# Patient Record
Sex: MALE | Race: WHITE | NOT HISPANIC OR LATINO | Employment: UNEMPLOYED | URBAN - METROPOLITAN AREA
[De-identification: names, ages, dates, MRNs, and addresses within clinical notes are randomized per-mention and may not be internally consistent; named-entity substitution may affect disease eponyms.]

---

## 2017-05-15 RX ORDER — MELOXICAM 15 MG/1
TABLET ORAL
Qty: 90 TABLET | Refills: 1 | OUTPATIENT
Start: 2017-05-15

## 2018-12-05 ENCOUNTER — OFFICE VISIT (OUTPATIENT)
Dept: PAIN MEDICINE | Facility: CLINIC | Age: 73
End: 2018-12-05

## 2018-12-05 VITALS
HEIGHT: 69 IN | RESPIRATION RATE: 15 BRPM | HEART RATE: 76 BPM | DIASTOLIC BLOOD PRESSURE: 80 MMHG | WEIGHT: 201.6 LBS | TEMPERATURE: 98 F | BODY MASS INDEX: 29.86 KG/M2 | OXYGEN SATURATION: 94 % | SYSTOLIC BLOOD PRESSURE: 146 MMHG

## 2018-12-05 DIAGNOSIS — M17.0 PRIMARY OSTEOARTHRITIS OF BOTH KNEES: ICD-10-CM

## 2018-12-05 DIAGNOSIS — M15.9 PRIMARY OSTEOARTHRITIS INVOLVING MULTIPLE JOINTS: ICD-10-CM

## 2018-12-05 DIAGNOSIS — G89.29 OTHER CHRONIC PAIN: Primary | ICD-10-CM

## 2018-12-05 LAB
POC AMPHETAMINES: NEGATIVE
POC BARBITURATES: NEGATIVE
POC BENZODIAZEPHINES: NEGATIVE
POC COCAINE: NEGATIVE
POC METHADONE: NEGATIVE
POC METHAMPHETAMINE SCREEN URINE: NEGATIVE
POC OPIATES: NEGATIVE
POC OXYCODONE: NEGATIVE
POC PHENCYCLIDINE: NEGATIVE
POC PROPOXYPHENE: NEGATIVE
POC THC: NEGATIVE
POC TRICYCLIC ANTIDEPRESSANTS: NEGATIVE

## 2018-12-05 PROCEDURE — 99214 OFFICE O/P EST MOD 30 MIN: CPT | Performed by: NURSE PRACTITIONER

## 2018-12-05 PROCEDURE — 80305 DRUG TEST PRSMV DIR OPT OBS: CPT | Performed by: NURSE PRACTITIONER

## 2018-12-05 RX ORDER — TRAMADOL HYDROCHLORIDE 50 MG/1
50 TABLET ORAL EVERY 6 HOURS PRN
Qty: 120 TABLET | Refills: 0 | Status: SHIPPED | OUTPATIENT
Start: 2018-12-05 | End: 2019-01-03 | Stop reason: SDUPTHER

## 2018-12-05 RX ORDER — ALPRAZOLAM 0.25 MG/1
TABLET ORAL
COMMUNITY
Start: 2018-11-23

## 2018-12-05 NOTE — PROGRESS NOTES
"CHIEF COMPLAINT  F/U osteoarthritic pain affecting knees and back. Pt states he would like to discuss medication. He has been taking tylenol \"like candy\" and it is not helping.    Subjective   Omero García is a 73 y.o. male  who presents to the office for follow-up.He has a history of chronic bilateral knee pain and joint pain.    Patient was last seen in the office 10/10/2016.  At that point time he is reporting bilateral knee pain.  Goal to hold off on any further surgery.  Minimal benefit with steroid injections and Synvisc injections.  That point in time he was maintained on tramadol 50 mg 3-4 per day, Voltaren gel, meloxicam and was reporting moderate pain reduction with this regimen.    He reports that he decided two years ago to stop his medication.  He reports significant reduction in phyical activity/mobility since that time. C/o diffuse joint pain today and can barely walk. Here today to re-establish medication regimen with goal of \"wanting to get active again\".  Knee and back are worst.      He does continue with use of Meloxicam and Tylenol.     Joint Pain   This is a chronic problem. The current episode started more than 1 year ago. The problem occurs daily. The problem has been gradually worsening. Associated symptoms include arthralgias. Pertinent negatives include no chest pain, chills, coughing, fever, headaches, numbness, vomiting or weakness. The symptoms are aggravated by walking and standing. He has tried NSAIDs for the symptoms. The treatment provided mild relief.   Back Pain   This is a chronic problem. The problem occurs daily. The problem has been gradually worsening since onset. The pain is present in the lumbar spine. The quality of the pain is described as aching. The pain is at a severity of 4/10. The symptoms are aggravated by standing (walking). Pertinent negatives include no bladder incontinence, bowel incontinence, chest pain, fever, headaches, numbness or weakness. He has " "tried NSAIDs and analgesics for the symptoms. The treatment provided moderate relief.      PEG Assessment   What number best describes your pain on average in the past week?7-8  What number best describes how, during the past week, pain has interfered with your enjoyment of life?10  What number best describes how, during the past week, pain has interfered with your general activity?  8    The following portions of the patient's history were reviewed and updated as appropriate: allergies, current medications, past family history, past medical history, past social history, past surgical history and problem list.    Review of Systems   Constitutional: Positive for activity change. Negative for chills and fever.   Respiratory: Negative for apnea, cough, shortness of breath and wheezing.    Cardiovascular: Negative for chest pain.   Gastrointestinal: Positive for constipation (occ). Negative for bowel incontinence, diarrhea and vomiting.   Genitourinary: Negative for bladder incontinence and difficulty urinating.   Musculoskeletal: Positive for arthralgias and back pain.   Neurological: Negative for dizziness, weakness, light-headedness, numbness and headaches.   Psychiatric/Behavioral: Negative for agitation, confusion, hallucinations, sleep disturbance and suicidal ideas. The patient is nervous/anxious.      Vitals:    12/05/18 1122 12/05/18 1131   BP: 166/97 146/80   Pulse: 76    Resp: 15    Temp: 98 °F (36.7 °C)    SpO2: 94%    Weight: 91.4 kg (201 lb 9.6 oz)    Height: 175.3 cm (69\")    PainSc:   4    PainLoc: Knee  Comment: joint pain      Objective   Physical Exam   Constitutional: He is oriented to person, place, and time. He appears well-developed and well-nourished.   HENT:   Head: Atraumatic.   Right Ear: External ear normal.   Eyes: Conjunctivae and EOM are normal.   Neck: Neck supple.   Cardiovascular: Normal rate.   Pulmonary/Chest: Effort normal. No respiratory distress.   Musculoskeletal:        Right " knee: Tenderness found.        Left knee: Tenderness found.        Cervical back: He exhibits tenderness.        Lumbar back: He exhibits tenderness and bony tenderness.   Diffuse OA changes - no acute synovitis noted    Neurological: He is alert and oriented to person, place, and time. He has normal strength. No sensory deficit. Gait (slowed) abnormal.   Skin: Skin is warm and dry.   Psychiatric: He has a normal mood and affect. His behavior is normal.   Nursing note and vitals reviewed.      Assessment/Plan   Omero was seen today for pain.    Diagnoses and all orders for this visit:    Other chronic pain    Primary osteoarthritis of both knees    Primary osteoarthritis involving multiple joints    Other orders  -     traMADol (ULTRAM) 50 MG tablet; Take 1 tablet by mouth Every 6 (Six) Hours As Needed for Severe Pain .      --- Obtain Lumbar MRI from [facility] -- consider interventional options for back   --- The patient has signed a copy of our prescribing agreement.  The has stated both verbal and written understanding that prescription pain medication may be stopped if - pain does not significantly decrease and/or function increase, there is evidence of diverting medication, if He obtained controlled substances from another licensed practitioner without my knowledge and approval, if I feel that it is in the patient's best interest, if He exhibits any aggressive behavior to any provider or staff member in this office.  The patient agrees to only use the medication exactly as prescribed, to fill controlled substances at the same pharmacy, to keep medication in the original container, and to store controlled substances in a locked cabinet or other secure storage unit. The patient agrees to notify the office immediately if medication is lost or stolen and will be asked to produce an official police report prior to replacing/continuing lost/stolen controlled substances.  The patient understands that there will be  routine monitoring including urine drug screens, pill counts, and review of pharmacy report.  The patient understands that He may be asked to submit to random drug screen or pill count. The patient will not seek early refills.  The patient will not use illegal street drugs while receiving controlled substances from this practice.  The patient understands that failure to adhere with this agreement may result in cessation of therapy with controlled substance prescribing.     --- Re-start Tramadol.    --- Routine UDS in office today as part of monitoring requirements for controlled substances.  The specimen was viewed and the immunoassay result reviewed and is negative across the board.  This specimen will be sent to [facility] laboratory for confirmation.     --- Follow-up 1 month          TUSHAR REPORT    As part of the patient's treatment plan, I am prescribing controlled substances. The patient has been made aware of appropriate use of such medications, including potential risk of somnolence, limited ability to drive and/or work safely, and the potential for dependence or overdose. It has also bee made clear that these medications are for use by this patient only, without concomitant use of alcohol or other substances unless prescribed.     Patient has completed prescribing agreement detailing terms of continued prescribing of controlled substances, including monitoring TUSHAR reports, urine drug screening, and pill counts if necessary. The patient is aware that inappropriate use will results in cessation of prescribing such medications.    TUSHAR report has been reviewed and scanned into the patient's chart.    As the clinician, I personally reviewed the TUSHAR from 12/4/2018 while the patient was in the office today.    History and physical exam exhibit continued safe and appropriate use of controlled substances.    EMR Dragon/Transcription disclaimer:   Much of this encounter note is an electronic  transcription/translation of spoken language to printed text. The electronic translation of spoken language may permit erroneous, or at times, nonsensical words or phrases to be inadvertently transcribed; Although I have reviewed the note for such errors, some may still exist.

## 2018-12-10 ENCOUNTER — RESULTS ENCOUNTER (OUTPATIENT)
Dept: PAIN MEDICINE | Facility: CLINIC | Age: 73
End: 2018-12-10

## 2018-12-10 DIAGNOSIS — M15.9 PRIMARY OSTEOARTHRITIS INVOLVING MULTIPLE JOINTS: ICD-10-CM

## 2018-12-10 DIAGNOSIS — M17.0 PRIMARY OSTEOARTHRITIS OF BOTH KNEES: ICD-10-CM

## 2018-12-10 DIAGNOSIS — G89.29 OTHER CHRONIC PAIN: ICD-10-CM

## 2019-01-03 ENCOUNTER — OFFICE VISIT (OUTPATIENT)
Dept: PAIN MEDICINE | Facility: CLINIC | Age: 74
End: 2019-01-03

## 2019-01-03 VITALS
OXYGEN SATURATION: 93 % | HEIGHT: 69 IN | HEART RATE: 90 BPM | TEMPERATURE: 97.9 F | RESPIRATION RATE: 16 BRPM | BODY MASS INDEX: 29.62 KG/M2 | WEIGHT: 200 LBS | DIASTOLIC BLOOD PRESSURE: 92 MMHG | SYSTOLIC BLOOD PRESSURE: 174 MMHG

## 2019-01-03 DIAGNOSIS — G89.29 CHRONIC LEFT-SIDED LOW BACK PAIN WITHOUT SCIATICA: ICD-10-CM

## 2019-01-03 DIAGNOSIS — Z79.899 ENCOUNTER FOR LONG-TERM CURRENT USE OF HIGH RISK MEDICATION: ICD-10-CM

## 2019-01-03 DIAGNOSIS — G89.29 OTHER CHRONIC PAIN: Primary | ICD-10-CM

## 2019-01-03 DIAGNOSIS — M54.50 CHRONIC LEFT-SIDED LOW BACK PAIN WITHOUT SCIATICA: ICD-10-CM

## 2019-01-03 DIAGNOSIS — M17.0 PRIMARY OSTEOARTHRITIS OF BOTH KNEES: ICD-10-CM

## 2019-01-03 DIAGNOSIS — M15.9 PRIMARY OSTEOARTHRITIS INVOLVING MULTIPLE JOINTS: ICD-10-CM

## 2019-01-03 PROCEDURE — 99214 OFFICE O/P EST MOD 30 MIN: CPT | Performed by: NURSE PRACTITIONER

## 2019-01-03 RX ORDER — TRAMADOL HYDROCHLORIDE 50 MG/1
50 TABLET ORAL EVERY 6 HOURS PRN
Qty: 120 TABLET | Refills: 1 | Status: SHIPPED | OUTPATIENT
Start: 2019-01-03 | End: 2019-04-02 | Stop reason: SDUPTHER

## 2019-01-03 NOTE — PATIENT INSTRUCTIONS
----------  Education about Sacroiliac joint injections:  This Sacroiliac joint injection (blockade) we have suggested is intended for diagnostic purposes, with the intent of offering the patient Radiofrequency thermal rhizotomy (RF) of the sensory branches to the joint if the block is diagnostically effective.  The diagnostic blockade is necessary to determine the likelihood that RF therapy could be efficacious in providing long term relief to the patient.    In this procedure, the sacroiliac joint is aligned with imaging, and under image guidance a needle is placed with the needle tip into the joint.  The needle position is confirmed to be appropriately in the joint before injection of medication into the joint.  When xray fluoroscopy is used, contrast dye is used to confirm a proper arthrogram (i.e., outline of the joint).  When ultrasound is used, IV fluid (normal saline) is injected to see the flow of the fluid into the joint.  Once confirmed, then the medication can be injected into the joint.  Oftentimes this medication is a combination of local anesthetics (for diagnostic purposes) and also a steroid (to decrease irritation & inflammation in the joint, also known as sacroilitis).      Medically, a successful RF procedure should provide a patient with 50% pain relief or more for at least 6 months.  Clinical experience suggests that successful patients receive relief more in the range of 12 months on average.  We also discussed that many patients receive therapeutic success from the intraarticular joint injection, and may not require RF ablation.  If a patient receives more than 8 weeks of relief from joint injection, then occasional repeat joint blocks for therapeutic purposes is a very reasonable alternative therapy.  This course of therapy is consistent with our LCDs according to our CMS  in the area, and therefore other insurance providers should follow accordingly.  We will monitor our  patients to screen for these therapeutic responders and will offer RF therapy only when necessary.      We discussed that joint injections & also RF procedures are not without risks.  Best practices regarding anticoagulant use & neuraxial procedures will be respected.  Oftentimes a patient on an anticoagulant can be offered a joint injection safely, but again this is not risk-free, and such patients give consent with regards to this increased bleeding risk, which could cause problems including but not limited to worsening of pain, nerve damage, or muscle damage.  Patients that are ill or otherwise may be at risk for sepsis will not have their spines accessed by neuraxial injections of any type.  This patient will not be offered these therapies if there is an increased risk.   We discussed that there is a risk of postprocedural pain and also a risk of worsening of clinical picture with these procedures as with any neuraxial procedure.    ----------    Epidural Steroid Injection  An epidural steroid injection is a shot of steroid medicine and numbing medicine that is given into the space between the spinal cord and the bones in your back (epidural space). The shot helps relieve pain caused by an irritated or swollen nerve root.  The amount of pain relief you get from the injection depends on what is causing the nerve to be swollen and irritated, and how long your pain lasts. You are more likely to benefit from this injection if your pain is strong and comes on suddenly rather than if you have had pain for a long time.  Tell a health care provider about:  · Any allergies you have.  · All medicines you are taking, including vitamins, herbs, eye drops, creams, and over-the-counter medicines.  · Any problems you or family members have had with anesthetic medicines.  · Any blood disorders you have.  · Any surgeries you have had.  · Any medical conditions you have.  · Whether you are pregnant or may be pregnant.  What are  the risks?  Generally, this is a safe procedure. However, problems may occur, including:  · Headache.  · Bleeding.  · Infection.  · Allergic reaction to medicines.  · Damage to your nerves.    What happens before the procedure?  Staying hydrated  Follow instructions from your health care provider about hydration, which may include:  · Up to 2 hours before the procedure - you may continue to drink clear liquids, such as water, clear fruit juice, black coffee, and plain tea.    Eating and drinking restrictions  Follow instructions from your health care provider about eating and drinking, which may include:  · 8 hours before the procedure - stop eating heavy meals or foods such as meat, fried foods, or fatty foods.  · 6 hours before the procedure - stop eating light meals or foods, such as toast or cereal.  · 6 hours before the procedure - stop drinking milk or drinks that contain milk.  · 2 hours before the procedure - stop drinking clear liquids.    Medicine  · You may be given medicines to lower anxiety.  · Ask your health care provider about:  ? Changing or stopping your regular medicines. This is especially important if you are taking diabetes medicines or blood thinners.  ? Taking medicines such as aspirin and ibuprofen. These medicines can thin your blood. Do not take these medicines before your procedure if your health care provider instructs you not to.  General instructions  · Plan to have someone take you home from the hospital or clinic.  What happens during the procedure?  · You may receive a medicine to help you relax (sedative).  · You will be asked to lie on your abdomen.  · The injection site will be cleaned.  · A numbing medicine (local anesthetic) will be used to numb the injection site.  · A needle will be inserted through your skin into the epidural space. You may feel some discomfort when this happens. An X-ray machine will be used to make sure the needle is put as close as possible to the  affected nerve.  · A steroid medicine and a local anesthetic will be injected into the epidural space.  · The needle will be removed.  · A bandage (dressing) will be put over the injection site.  What happens after the procedure?  · Your blood pressure, heart rate, breathing rate, and blood oxygen level will be monitored until the medicines you were given have worn off.  · Your arm or leg may feel weak or numb for a few hours.  · The injection site may feel sore.  · Do not drive for 24 hours if you received a sedative.  This information is not intended to replace advice given to you by your health care provider. Make sure you discuss any questions you have with your health care provider.  Document Released: 03/26/2009 Document Revised: 05/31/2017 Document Reviewed: 04/04/2017  [employer] Interactive Patient Education © 2018 [employer]    -----  Education about Genicular Nerve Blocks & RF at the Knee:    We engaged in an education session about the theory of genicular nerve blockade at the knee and also how radiofrequency therapy may help to provide sustained relief from chronic knee pain.      In patients with long term knee pain and/or chronic degenerative joint disease, who either fail or are not good candidates for long term medication management of knee pain, and who may have failed with intraarticular corticosteroid and/or hyaluronic acid injections in the knee, and who either may not be good orthopedic surgical candidates or may be wanting to avoid knee replacement, or have had knee replacements but continue to have severe pain, the consideration for Genicular nerve RF therapy may be a reasonable option to try to provide longer term pain relief.       The potential efficacy of the radiofrequency therapy is considered with genicular nerve blockades as a diagnostic therapy.  There is prognostic indication that patients who have diagnostic positive blockades of the nerves may respond well to radiofrequency  therapy of the same nerves.      The genicular nerves are sensory nerve branches of major nerves to the lower extremity, especially the tibial and the common peroneal nerves.  The medial branches are primarily supplied by the tibial nerve, and the lateral branches primarily by the common peroneal nerve.  These four genicular nerve branches in essence form a square of innervation around the knee.  The inferolateral branch is not easily accessible because of the fibula.  Therefore, the three branches targeted are the superolateral, superomedial, and inferomedial.      Under Xray guidance, needles are placed to these three branches' approximate locations and the branches are numbed with local anesthetic.  The question is whether blocking these branches is able to block the patient's perception of pain to the area, thus making the procedure a diagnostic blockade.   Usually the blockade is repeated to confirm reproducible diagnostic results.  Most often the blockades are performed under monitored anesthesia care, to aid in the patient being able awaken quickly from sedation and be able to engage in activities, therefore making this process an active diagnostic procedure.      If diagnostically positive (oftentimes x2), then Radiofrequency lesioning of the branches may be considered.  One or two needles are placed to the location of each nerve branch, and the needles are heated to thermally treat the nerve branches by injuring the branches, thereby inhibiting their ability to transmit painful sensory signals.  This therapy is usually performed under monitored anesthesia care as well.  The procedure could be repeated every 6-12 months as needed.  Risks of the procedure include but are not limited to bleeding, infection, injury, worsening of clinical picture, and nerve injury.    ------

## 2019-01-03 NOTE — PROGRESS NOTES
"CHIEF COMPLAINT  Pt continues with significant diffuse joint pain,with bilateral knee being the primary pain and back pain \"nearly as bad as knees\".  Tramadol,mobic and tylenol does help reduce pain somewhat.    Subjective   Omero García is a 73 y.o. male  who presents to the office for follow-up.He has a history of chronic joint pain.    Patient last seen in the office one month ago, prior to that it had been two years since the last office visit during which time he had discontinued his pain medication. He noticed a subsequent decline in his physical activity and functionality over those years and returned to our office requesting to resume pain medication for his diffuse joint pain.  Tramadol 50 mg 4/day started, he also continues with use of Meloxicam 15 mg daily and Tylenol PRN.  He reports that this regimen is helping his pain.  Taking a stool softener for constipation which is helping.      C/o back and joint pain (back pain the worse).  Left sided lumbosacral area pain, does not radiate.      Has had multiple left knee injections and Synvisc injections which helped at first.  He needs a knee replacement but has been putting this off.      Has wine/liquor/beer - 2 drinks a day     Joint Pain   This is a chronic problem. The current episode started more than 1 year ago. The problem occurs daily. The problem has been gradually worsening. Associated symptoms include arthralgias. Pertinent negatives include no chest pain, chills, coughing, fever, headaches, numbness, vomiting or weakness. The symptoms are aggravated by walking and standing. He has tried NSAIDs for the symptoms. The treatment provided mild relief.   Back Pain   This is a chronic problem. The problem occurs daily. The problem has been gradually worsening since onset. The pain is present in the lumbar spine. The quality of the pain is described as aching. The pain is at a severity of 4/10. The symptoms are aggravated by standing (walking). " "Pertinent negatives include no bladder incontinence, bowel incontinence, chest pain, fever, headaches, numbness or weakness. He has tried NSAIDs and analgesics for the symptoms. The treatment provided moderate relief.            PEG Assessment   What number best describes your pain on average in the past week?7  What number best describes how, during the past week, pain has interfered with your enjoyment of life?9  What number best describes how, during the past week, pain has interfered with your general activity?  9    The following portions of the patient's history were reviewed and updated as appropriate: allergies, current medications, past family history, past medical history, past social history, past surgical history and problem list.    Review of Systems   Constitutional: Positive for activity change. Negative for chills and fever.   Respiratory: Negative for apnea, cough, shortness of breath and wheezing.    Cardiovascular: Negative for chest pain.   Gastrointestinal: Positive for constipation (occ). Negative for bowel incontinence, diarrhea and vomiting.   Genitourinary: Negative for bladder incontinence and difficulty urinating.   Musculoskeletal: Positive for arthralgias and back pain.   Neurological: Negative for dizziness, weakness, light-headedness, numbness and headaches.   Psychiatric/Behavioral: Negative for agitation, confusion, hallucinations and suicidal ideas. The patient is nervous/anxious.        Vitals:    01/03/19 1128   BP: 174/92   Pulse: 90   Resp: 16   Temp: 97.9 °F (36.6 °C)   SpO2: 93%   Weight: 90.7 kg (200 lb)   Height: 175.3 cm (69.02\")   PainSc:   4   PainLoc: Generalized  Comment: joint pain ranges from 4-7/10     Objective   Physical Exam   Constitutional: He is oriented to person, place, and time. He appears well-developed and well-nourished. No distress.   HENT:   Head: Atraumatic.   Right Ear: External ear normal.   Eyes: Conjunctivae and EOM are normal.   Neck: Neck supple. "   Cardiovascular: Normal rate.   Pulmonary/Chest: Effort normal. No respiratory distress.   Musculoskeletal:        Right knee: Tenderness found.        Left knee: Tenderness found.        Cervical back: He exhibits tenderness.        Lumbar back: He exhibits tenderness and bony tenderness.   Diffuse OA changes - no acute synovitis noted   +left SI joint tenderness   Neurological: He is alert and oriented to person, place, and time. He has normal strength. No sensory deficit. Gait (slowed, ambulates with cane) abnormal.   Skin: Skin is warm and dry. He is not diaphoretic.   Psychiatric: He has a normal mood and affect. His behavior is normal.   Nursing note and vitals reviewed.    Assessment/Plan   Omero was seen today for arthritis.    Diagnoses and all orders for this visit:    Other chronic pain    Primary osteoarthritis involving multiple joints    Primary osteoarthritis of both knees    Chronic left-sided low back pain without sciatica    Encounter for long-term current use of high risk medication    Other orders  -     traMADol (ULTRAM) 50 MG tablet; Take 1 tablet by mouth Every 6 (Six) Hours As Needed for Severe Pain .      --- Left SI joint injection as needed in the event of a flare, could also consider LESI, Genicular nerve blocks.  Patient refuses intervention options today, given information on all three pain management procedures.    --- Refill Tramadol. WILL NOT CHANGE REGIMEN AS THERE ARE MULTIPLE INTERVENTIONAL OPTIONS TO OFFER.  Patient appears stable with current regimen. No adverse effects. Regarding continuation of opioids, there is no evidence of aberrant behavior or any red flags.  The patient continues with appropriate response to opioid therapy. ADL's remain intact by self.   --- The urine drug screen confirmation from 12/5/18 has been reviewed and the result is appropriate based on patient history and TUSHAR report.  ELEVATED ALCOHOL LEVELS, COUNSELED TODAY ON NOT DRINKING WHILE TAKING PAIN  MEDICATION.    --- Follow-up 2 months      TUSHAR REPORT    As part of the patient's treatment plan, I am prescribing controlled substances. The patient has been made aware of appropriate use of such medications, including potential risk of somnolence, limited ability to drive and/or work safely, and the potential for dependence or overdose. It has also bee made clear that these medications are for use by this patient only, without concomitant use of alcohol or other substances unless prescribed.     Patient has completed prescribing agreement detailing terms of continued prescribing of controlled substances, including monitoring TUSHAR reports, urine drug screening, and pill counts if necessary. The patient is aware that inappropriate use will results in cessation of prescribing such medications.    TUSHAR report has been reviewed and scanned into the patient's chart.    As the clinician, I personally reviewed the TUSHAR from 1-2-19 while the patient was in the office today.    History and physical exam exhibit continued safe and appropriate use of controlled substances.    EMR Dragon/Transcription disclaimer:   Much of this encounter note is an electronic transcription/translation of spoken language to printed text. The electronic translation of spoken language may permit erroneous, or at times, nonsensical words or phrases to be inadvertently transcribed; Although I have reviewed the note for such errors, some may still exist.

## 2019-02-22 RX ORDER — MELOXICAM 15 MG/1
TABLET ORAL
Qty: 90 TABLET | Refills: 1 | Status: SHIPPED | OUTPATIENT
Start: 2019-02-22 | End: 2019-04-02 | Stop reason: SDUPTHER

## 2019-04-02 ENCOUNTER — OFFICE VISIT (OUTPATIENT)
Dept: PAIN MEDICINE | Facility: CLINIC | Age: 74
End: 2019-04-02

## 2019-04-02 VITALS
RESPIRATION RATE: 16 BRPM | HEART RATE: 89 BPM | TEMPERATURE: 98 F | WEIGHT: 199 LBS | BODY MASS INDEX: 29.47 KG/M2 | SYSTOLIC BLOOD PRESSURE: 137 MMHG | DIASTOLIC BLOOD PRESSURE: 83 MMHG | OXYGEN SATURATION: 98 % | HEIGHT: 69 IN

## 2019-04-02 DIAGNOSIS — G89.29 OTHER CHRONIC PAIN: Primary | ICD-10-CM

## 2019-04-02 DIAGNOSIS — Z79.899 ENCOUNTER FOR LONG-TERM CURRENT USE OF HIGH RISK MEDICATION: ICD-10-CM

## 2019-04-02 DIAGNOSIS — M17.0 PRIMARY OSTEOARTHRITIS OF BOTH KNEES: ICD-10-CM

## 2019-04-02 DIAGNOSIS — M54.50 CHRONIC LEFT-SIDED LOW BACK PAIN WITHOUT SCIATICA: ICD-10-CM

## 2019-04-02 DIAGNOSIS — G89.29 CHRONIC LEFT-SIDED LOW BACK PAIN WITHOUT SCIATICA: ICD-10-CM

## 2019-04-02 PROCEDURE — 99214 OFFICE O/P EST MOD 30 MIN: CPT | Performed by: NURSE PRACTITIONER

## 2019-04-02 RX ORDER — TRAMADOL HYDROCHLORIDE 50 MG/1
50 TABLET ORAL EVERY 6 HOURS PRN
Qty: 120 TABLET | Refills: 1 | Status: SHIPPED | OUTPATIENT
Start: 2019-04-02 | End: 2019-06-03 | Stop reason: SDUPTHER

## 2019-04-02 RX ORDER — SILDENAFIL CITRATE 20 MG/1
TABLET ORAL
Refills: 5 | COMMUNITY
Start: 2019-03-21

## 2019-04-02 RX ORDER — ZOLPIDEM TARTRATE 10 MG/1
TABLET ORAL
COMMUNITY
Start: 2019-02-14

## 2019-04-02 RX ORDER — TOLTERODINE 4 MG/1
CAPSULE, EXTENDED RELEASE ORAL
COMMUNITY
Start: 2019-03-26

## 2019-04-02 RX ORDER — MELOXICAM 15 MG/1
15 TABLET ORAL DAILY
Qty: 90 TABLET | Refills: 1 | Status: SHIPPED | OUTPATIENT
Start: 2019-04-02 | End: 2019-06-03 | Stop reason: SDUPTHER

## 2019-04-02 NOTE — PROGRESS NOTES
CHIEF COMPLAINT  Pt is here to f/u on his Bi-lat knee and Lbp. Pt sts the pain is worse since last visit.    Subjective   Omero García is a 73 y.o. male  who presents to the office for follow-up.He has a history of chronic back and knee pain.     C/o back and joint pain (knee pain is most significant).  Left sided lumbosacral area pain, does not radiate. Maintained on Tramadol 50 mg 4/day, he also continues with use of Meloxicam 15 mg daily and Tylenol PRN.  He reports that this regimen is helping his pain.  Taking a stool softener for constipation which is helping.       Has had multiple left knee injections and Synvisc injections which helped at first.  He needs a knee replacement but has been putting this off.      Has wine/liquor/beer - 2 drinks a day      Joint Pain   This is a chronic problem. The current episode started more than 1 year ago. The problem occurs daily. The problem has been gradually worsening. Associated symptoms include arthralgias. Pertinent negatives include no abdominal pain, chest pain, chills, congestion, coughing, fatigue, fever, headaches, neck pain (Occ), numbness, vomiting or weakness. The symptoms are aggravated by walking and standing. He has tried NSAIDs for the symptoms. The treatment provided mild relief.   Back Pain   This is a chronic problem. The problem occurs daily. The problem has been waxing and waning since onset. The pain is present in the lumbar spine. The quality of the pain is described as aching. The pain is at a severity of 6/10. The symptoms are aggravated by standing (walking). Pertinent negatives include no abdominal pain, bladder incontinence, bowel incontinence, chest pain, fever, headaches, numbness or weakness. He has tried NSAIDs and analgesics for the symptoms. The treatment provided moderate relief.      PEG Assessment   What number best describes your pain on average in the past week?7  What number best describes how, during the past week, pain has  "interfered with your enjoyment of life?9  What number best describes how, during the past week, pain has interfered with your general activity?  9    The following portions of the patient's history were reviewed and updated as appropriate: allergies, current medications, past family history, past medical history, past social history, past surgical history and problem list.    Review of Systems   Constitutional: Negative for chills, fatigue and fever.   HENT: Negative for congestion.    Respiratory: Negative for cough and shortness of breath.    Cardiovascular: Negative for chest pain.   Gastrointestinal: Negative for abdominal pain, bowel incontinence and vomiting.   Genitourinary: Negative for bladder incontinence and difficulty urinating.   Musculoskeletal: Positive for arthralgias and back pain. Negative for neck pain (Occ).   Neurological: Negative for weakness, light-headedness, numbness and headaches.   Psychiatric/Behavioral: Negative for suicidal ideas.       Vitals:    04/02/19 1129   BP: 137/83   Pulse: 89   Resp: 16   Temp: 98 °F (36.7 °C)   SpO2: 98%   Weight: 90.3 kg (199 lb)   Height: 175.3 cm (69.02\")   PainSc:   6   PainLoc: Knee  Comment: Pt sts Bi-lat knee and back       Objective   Physical Exam   Constitutional: He is oriented to person, place, and time. He appears well-developed and well-nourished. No distress.   HENT:   Head: Atraumatic.   Right Ear: External ear normal.   Eyes: Conjunctivae and EOM are normal.   Neck: Neck supple.   Cardiovascular: Normal rate.   Pulmonary/Chest: Effort normal. No respiratory distress.   Musculoskeletal:        Right knee: Tenderness found.        Left knee: Tenderness found.        Lumbar back: He exhibits tenderness and bony tenderness.   Diffuse OA changes - no acute synovitis noted   +left SI joint tenderness   Neurological: He is alert and oriented to person, place, and time. He has normal strength. No sensory deficit. Gait (slowed, ambulates with cane) " abnormal.   Skin: Skin is warm and dry. He is not diaphoretic.   Psychiatric: He has a normal mood and affect. His behavior is normal.   Nursing note and vitals reviewed.      Assessment/Plan   Omero was seen today for knee pain and back pain.    Diagnoses and all orders for this visit:    Other chronic pain    Primary osteoarthritis of both knees    Chronic left-sided low back pain without sciatica    Encounter for long-term current use of high risk medication    Other orders  -     meloxicam (MOBIC) 15 MG tablet; Take 1 tablet by mouth Daily.  -     traMADol (ULTRAM) 50 MG tablet; Take 1 tablet by mouth Every 6 (Six) Hours As Needed for Severe Pain .      --- Refill Tramadol. Patient appears stable with current regimen. No adverse effects. Regarding continuation of opioids, there is no evidence of aberrant behavior or any red flags.  The patient continues with appropriate response to opioid therapy. ADL's remain intact by self.   --- The urine drug screen confirmation from 12/5/18 has been reviewed and the result is appropriate based on patient history and TUSHAR report. UPDATE NEXT VISIT - HE HAS BEEN COUNSELED ON NOT MIXING ALCOHOL AND PAIN MEDICATION   --- Consider bilateral GNB - not interested today   --- Follow-up 2 months         TUSHAR REPORT    As part of the patient's treatment plan, I am prescribing controlled substances. The patient has been made aware of appropriate use of such medications, including potential risk of somnolence, limited ability to drive and/or work safely, and the potential for dependence or overdose. It has also bee made clear that these medications are for use by this patient only, without concomitant use of alcohol or other substances unless prescribed.     Patient has completed prescribing agreement detailing terms of continued prescribing of controlled substances, including monitoring TUSHAR reports, urine drug screening, and pill counts if necessary. The patient is aware that  inappropriate use will results in cessation of prescribing such medications.    TUSHAR report has been reviewed and scanned into the patient's chart.    As the clinician, I personally reviewed the TUSHAR from 4/1/19 while the patient was in the office today.    History and physical exam exhibit continued safe and appropriate use of controlled substances.    EMR Dragon/Transcription disclaimer:   Much of this encounter note is an electronic transcription/translation of spoken language to printed text. The electronic translation of spoken language may permit erroneous, or at times, nonsensical words or phrases to be inadvertently transcribed; Although I have reviewed the note for such errors, some may still exist.

## 2019-06-03 ENCOUNTER — OFFICE VISIT (OUTPATIENT)
Dept: PAIN MEDICINE | Facility: CLINIC | Age: 74
End: 2019-06-03

## 2019-06-03 VITALS
OXYGEN SATURATION: 94 % | DIASTOLIC BLOOD PRESSURE: 89 MMHG | TEMPERATURE: 97.9 F | SYSTOLIC BLOOD PRESSURE: 164 MMHG | HEART RATE: 80 BPM | RESPIRATION RATE: 20 BRPM | WEIGHT: 194.4 LBS | BODY MASS INDEX: 28.79 KG/M2 | HEIGHT: 69 IN

## 2019-06-03 DIAGNOSIS — M15.8 OTHER OSTEOARTHRITIS INVOLVING MULTIPLE JOINTS: ICD-10-CM

## 2019-06-03 DIAGNOSIS — Z79.899 ENCOUNTER FOR LONG-TERM CURRENT USE OF HIGH RISK MEDICATION: ICD-10-CM

## 2019-06-03 DIAGNOSIS — G89.29 CHRONIC LEFT-SIDED LOW BACK PAIN WITHOUT SCIATICA: ICD-10-CM

## 2019-06-03 DIAGNOSIS — M17.0 OSTEOARTHRITIS OF BOTH KNEES, UNSPECIFIED OSTEOARTHRITIS TYPE: ICD-10-CM

## 2019-06-03 DIAGNOSIS — M54.50 CHRONIC LEFT-SIDED LOW BACK PAIN WITHOUT SCIATICA: ICD-10-CM

## 2019-06-03 DIAGNOSIS — G89.4 CHRONIC PAIN SYNDROME: Primary | ICD-10-CM

## 2019-06-03 LAB
POC AMPHETAMINES: NEGATIVE
POC BARBITURATES: NEGATIVE
POC BENZODIAZEPHINES: POSITIVE
POC COCAINE: NEGATIVE
POC METHADONE: NEGATIVE
POC METHAMPHETAMINE SCREEN URINE: NEGATIVE
POC OPIATES: NEGATIVE
POC OXYCODONE: NEGATIVE
POC PHENCYCLIDINE: NEGATIVE
POC PROPOXYPHENE: NEGATIVE
POC THC: NEGATIVE
POC TRICYCLIC ANTIDEPRESSANTS: NEGATIVE

## 2019-06-03 PROCEDURE — 80305 DRUG TEST PRSMV DIR OPT OBS: CPT | Performed by: ANESTHESIOLOGY

## 2019-06-03 PROCEDURE — 99213 OFFICE O/P EST LOW 20 MIN: CPT | Performed by: ANESTHESIOLOGY

## 2019-06-03 RX ORDER — TRAMADOL HYDROCHLORIDE 50 MG/1
50 TABLET ORAL EVERY 6 HOURS PRN
Qty: 120 TABLET | Refills: 1 | Status: SHIPPED | OUTPATIENT
Start: 2019-06-03 | End: 2019-08-22 | Stop reason: SDUPTHER

## 2019-06-03 RX ORDER — MELOXICAM 15 MG/1
15 TABLET ORAL DAILY
Qty: 90 TABLET | Refills: 1 | Status: SHIPPED | OUTPATIENT
Start: 2019-06-03 | End: 2019-08-22 | Stop reason: SDUPTHER

## 2019-06-03 NOTE — PROGRESS NOTES
CHIEF COMPLAINT    F/u bilat knee and back pain. Pt states pain has worsened since last ov. Pt has been using tramadol and mobic to control pain. Pt states difficulty walking due to increased knee pain, sitting decreases pain.     Subjective   Omero García is a 73 y.o. male  who presents to the office for follow-up.He has a history of knee pain and back pain.    He states that he has been engaging in more physical activity.  Has been trying to walk daily.  The increase walking clears his pain and does make it then difficult to walk.  He notes that he feels better when he rests but he also does not want to be sedentary and he expresses concern about worsening of her overall condition with sedentary status.    Joint Pain   This is a chronic problem. The current episode started more than 1 year ago. The problem occurs daily. The problem has been gradually worsening. Associated symptoms include arthralgias (bilat knees), fatigue (occ) and weakness (bilat knees). Pertinent negatives include no abdominal pain, chest pain, chills, congestion, coughing, fever, headaches, neck pain (Occ), numbness or vomiting. The symptoms are aggravated by walking and standing. He has tried NSAIDs for the symptoms. The treatment provided mild relief.   Back Pain   This is a chronic problem. The problem occurs daily. The problem has been gradually worsening since onset. The pain is present in the lumbar spine. The quality of the pain is described as aching. The pain is at a severity of 6/10. The symptoms are aggravated by standing (walking). Associated symptoms include weakness (bilat knees). Pertinent negatives include no abdominal pain, bladder incontinence, bowel incontinence, chest pain, fever, headaches or numbness. He has tried NSAIDs and analgesics for the symptoms. The treatment provided moderate relief.        PEG Assessment   What number best describes your pain on average in the past week?8  What number best describes how,  during the past week, pain has interfered with your enjoyment of life?9  What number best describes how, during the past week, pain has interfered with your general activity?  9    --  The aforementioned information the Chief Complaint section and above subjective data including any HPI data has been personally reviewed and affirmed.  --        The following portions of the patient's history were reviewed and updated as appropriate: allergies, current medications, past family history, past medical history, past social history, past surgical history and problem list.    -------    The following portions of the patient's history were reviewed and updated as appropriate: allergies, current medications, past family history, past medical history, past social history, past surgical history and problem list.    No Known Allergies      Current Outpatient Medications:   •  ALPRAZolam (XANAX) 0.25 MG tablet, , Disp: , Rfl:   •  amLODIPine (NORVASC) 5 MG tablet, Take  by mouth daily., Disp: , Rfl:   •  atenolol (TENORMIN) 25 MG tablet, , Disp: , Rfl:   •  levothyroxine (SYNTHROID, LEVOTHROID) 100 MCG tablet, Take  by mouth daily., Disp: , Rfl:   •  meloxicam (MOBIC) 15 MG tablet, Take 1 tablet by mouth Daily., Disp: 90 tablet, Rfl: 1  •  metFORMIN XR (GLUCOPHAGE-XR) 500 MG 24 hr tablet, Take  by mouth 2 (two) times a day., Disp: , Rfl:   •  sildenafil (REVATIO) 20 MG tablet, take 1-5 tablets BY MOUTH AS NEEDED, start with lowest DOSE AND increase AS NEEDED, Disp: , Rfl: 5  •  Testosterone Cypionate (DEPOTESTOTERONE CYPIONATE) 200 MG/ML injection, Apply  to cheek., Disp: , Rfl:   •  tolterodine LA (DETROL LA) 4 MG 24 hr capsule, , Disp: , Rfl:   •  traMADol (ULTRAM) 50 MG tablet, Take 1 tablet by mouth Every 6 (Six) Hours As Needed for Severe Pain ., Disp: 120 tablet, Rfl: 1  •  zolpidem (AMBIEN) 10 MG tablet, , Disp: , Rfl:     Current Outpatient Medications on File Prior to Visit   Medication Sig Dispense Refill   •  ALPRAZolam (XANAX) 0.25 MG tablet      • amLODIPine (NORVASC) 5 MG tablet Take  by mouth daily.     • atenolol (TENORMIN) 25 MG tablet      • levothyroxine (SYNTHROID, LEVOTHROID) 100 MCG tablet Take  by mouth daily.     • metFORMIN XR (GLUCOPHAGE-XR) 500 MG 24 hr tablet Take  by mouth 2 (two) times a day.     • sildenafil (REVATIO) 20 MG tablet take 1-5 tablets BY MOUTH AS NEEDED, start with lowest DOSE AND increase AS NEEDED  5   • Testosterone Cypionate (DEPOTESTOTERONE CYPIONATE) 200 MG/ML injection Apply  to cheek.     • tolterodine LA (DETROL LA) 4 MG 24 hr capsule      • zolpidem (AMBIEN) 10 MG tablet      • [DISCONTINUED] meloxicam (MOBIC) 15 MG tablet Take 1 tablet by mouth Daily. 90 tablet 1   • [DISCONTINUED] traMADol (ULTRAM) 50 MG tablet Take 1 tablet by mouth Every 6 (Six) Hours As Needed for Severe Pain . 120 tablet 1     No current facility-administered medications on file prior to visit.        Patient Active Problem List   Diagnosis   • Chronic pain due to injury   • Degenerative joint disease involving multiple joints   • Arthritis of knee, degenerative   • Chronic left-sided low back pain without sciatica   • Encounter for long-term current use of high risk medication       Past Medical History:   Diagnosis Date   • Arthritis    • Diabetes (CMS/HCC)    • Hypertension    • Hypothyroidism    • Septicemia (CMS/HCC)        Past Surgical History:   Procedure Laterality Date   • HAND SURGERY     • REPLACEMENT TOTAL KNEE     • TONSILLECTOMY         Family History   Problem Relation Age of Onset   • Hypertension Mother    • Arthritis Mother    • Diabetes Father    • Hypertension Father    • Arthritis Father        Social History     Socioeconomic History   • Marital status:      Spouse name: Not on file   • Number of children: Not on file   • Years of education: Not on file   • Highest education level: Not on file   Tobacco Use   • Smoking status: Former Smoker   • Smokeless tobacco: Never Used  "  Substance and Sexual Activity   • Alcohol use: Yes     Comment: occ   • Drug use: No   • Sexual activity: Defer       -------        Review of Systems   Constitutional: Positive for activity change (dec) and fatigue (occ). Negative for chills and fever.   HENT: Negative for congestion.    Respiratory: Negative for cough and shortness of breath.    Cardiovascular: Negative for chest pain.   Gastrointestinal: Positive for constipation (taking stool softner). Negative for abdominal pain, bowel incontinence, diarrhea and vomiting.   Genitourinary: Negative for bladder incontinence and difficulty urinating.   Musculoskeletal: Positive for arthralgias (bilat knees) and back pain. Negative for neck pain (Occ).   Neurological: Positive for dizziness (occ) and weakness (bilat knees). Negative for light-headedness, numbness and headaches.   Psychiatric/Behavioral: Negative for sleep disturbance and suicidal ideas.               -------    Prelim UDS Immunoassay:    THC  (marijuana)  negative  AMRIT (cocaine) negative  OPI (opiate) negative  AMP (amphet) negative  MET (methamph) negative  PCP (pcp)  negative  MDMA (ecstacy) negative  BAR (barbituate) negative  BZO (benzodiaz) positive  MTD (methadone) negative  TCA (tricyclic) negative  OXY (oxycodone) negative    GreenTemp warm  Appearance WNL    -------        Vitals:    06/03/19 1123   BP: 164/89  Comment: pt took bp med this am   Pulse: 80   Resp: 20   Temp: 97.9 °F (36.6 °C)   SpO2: 94%   Weight: 88.2 kg (194 lb 6.4 oz)   Height: 175.3 cm (69.02\")   PainSc:   2   PainLoc: Knee  Comment: bilat         Objective   Physical Exam  VSS, NNR, NCAT, NMNA, NRD, AAOx3.  He has a mild limp.  He can move his knees throughout range of motion with some mild crepitus bilaterally.  He is able to flex his lumbar spine to toe-touch.  Minimal pain on extension to 10 degrees.  Lumbar loading is positive.      Assessment/Plan   Omero was seen today for back pain and knee pain.    Diagnoses " and all orders for this visit:    Chronic pain syndrome    Encounter for long-term current use of high risk medication    Other osteoarthritis involving multiple joints    Chronic left-sided low back pain without sciatica    Osteoarthritis of both knees, unspecified osteoarthritis type    Other orders  -     traMADol (ULTRAM) 50 MG tablet; Take 1 tablet by mouth Every 6 (Six) Hours As Needed for Severe Pain .  -     meloxicam (MOBIC) 15 MG tablet; Take 1 tablet by mouth Daily.      We discussed the importance of maintaining physical activity, and also reasonable expectations about medication use in helping to manage pain, considering pain a chronic disease, and that it is a disease to be managed rather than cured.  He expresses a desire to avoid escalation of medications.    Regarding his functional goals, it seems with his activity levels that he is meeting them.    --- Follow-up 2 months    --UDS today for routine confirmation           Patient appears stable with current regimen. No adverse effects. Regarding continuation of opioids, there is no evidence of aberrant behavior or any red flags.  The patient continues with appropriate response to opioid therapy. ADL's remain intact by self.         TUSHAR REPORT    As part of the patient's treatment plan, I am prescribing controlled substances. The patient has been made aware of appropriate use of such medications, including potential risk of somnolence, limited ability to drive and/or work safely, and the potential for dependence or overdose. It has also bee made clear that these medications are for use by this patient only, without concomitant use of alcohol or other substances unless prescribed.     Patient has completed prescribing agreement detailing terms of continued prescribing of controlled substances, including monitoring TUSHAR reports, urine drug screening, and pill counts if necessary. The patient is aware that inappropriate use will results in  cessation of prescribing such medications.    TUSHAR report has been reviewed and scanned into the patient's chart.    As the clinician, I personally reviewed the TUSHAR from as above while the patient was in the office today.    History and physical exam exhibit continued safe and appropriate use of controlled substances.      EMR Dragon/Transcription disclaimer:   Much of this encounter note is an electronic transcription/translation of spoken language to printed text. The electronic translation of spoken language may permit erroneous, or at times, nonsensical words or phrases to be inadvertently transcribed; Although I have reviewed the note for such errors, some may still exist.

## 2019-06-08 ENCOUNTER — RESULTS ENCOUNTER (OUTPATIENT)
Dept: PAIN MEDICINE | Facility: CLINIC | Age: 74
End: 2019-06-08

## 2019-06-08 DIAGNOSIS — M15.8 OTHER OSTEOARTHRITIS INVOLVING MULTIPLE JOINTS: ICD-10-CM

## 2019-06-08 DIAGNOSIS — M17.0 OSTEOARTHRITIS OF BOTH KNEES, UNSPECIFIED OSTEOARTHRITIS TYPE: ICD-10-CM

## 2019-06-08 DIAGNOSIS — M54.50 CHRONIC LEFT-SIDED LOW BACK PAIN WITHOUT SCIATICA: ICD-10-CM

## 2019-06-08 DIAGNOSIS — G89.4 CHRONIC PAIN SYNDROME: ICD-10-CM

## 2019-06-08 DIAGNOSIS — Z79.899 ENCOUNTER FOR LONG-TERM CURRENT USE OF HIGH RISK MEDICATION: ICD-10-CM

## 2019-06-08 DIAGNOSIS — G89.29 CHRONIC LEFT-SIDED LOW BACK PAIN WITHOUT SCIATICA: ICD-10-CM

## 2019-08-22 ENCOUNTER — OFFICE VISIT (OUTPATIENT)
Dept: PAIN MEDICINE | Facility: CLINIC | Age: 74
End: 2019-08-22

## 2019-08-22 VITALS
OXYGEN SATURATION: 93 % | HEIGHT: 69 IN | RESPIRATION RATE: 18 BRPM | BODY MASS INDEX: 29.24 KG/M2 | TEMPERATURE: 98.1 F | SYSTOLIC BLOOD PRESSURE: 158 MMHG | WEIGHT: 197.4 LBS | HEART RATE: 80 BPM | DIASTOLIC BLOOD PRESSURE: 82 MMHG

## 2019-08-22 DIAGNOSIS — G89.29 OTHER CHRONIC PAIN: Primary | ICD-10-CM

## 2019-08-22 DIAGNOSIS — M54.50 CHRONIC LEFT-SIDED LOW BACK PAIN WITHOUT SCIATICA: ICD-10-CM

## 2019-08-22 DIAGNOSIS — G89.29 CHRONIC LEFT-SIDED LOW BACK PAIN WITHOUT SCIATICA: ICD-10-CM

## 2019-08-22 DIAGNOSIS — Z79.899 ENCOUNTER FOR LONG-TERM CURRENT USE OF HIGH RISK MEDICATION: ICD-10-CM

## 2019-08-22 DIAGNOSIS — M15.8 OTHER OSTEOARTHRITIS INVOLVING MULTIPLE JOINTS: ICD-10-CM

## 2019-08-22 PROCEDURE — 99214 OFFICE O/P EST MOD 30 MIN: CPT | Performed by: NURSE PRACTITIONER

## 2019-08-22 RX ORDER — MELOXICAM 15 MG/1
15 TABLET ORAL DAILY
Qty: 90 TABLET | Refills: 1 | Status: SHIPPED | OUTPATIENT
Start: 2019-08-22 | End: 2020-03-03 | Stop reason: SDUPTHER

## 2019-08-22 RX ORDER — TRAMADOL HYDROCHLORIDE 50 MG/1
50 TABLET ORAL EVERY 6 HOURS PRN
Qty: 120 TABLET | Refills: 1 | Status: SHIPPED | OUTPATIENT
Start: 2019-08-22 | End: 2020-03-03 | Stop reason: SDUPTHER

## 2019-08-22 NOTE — PROGRESS NOTES
CHIEF COMPLAINT  Follow-up for back and knee pain. Mr. García states that he fell Monday in his yard and landed on his chest. He states that he has had increases pain since then.    Subjective   Omero García is a 73 y.o. male  who presents to the office for follow-up.He has a history of joint pain, back pain.    C/o back and joint pain (knee pain is most significant).  Left sided lumbosacral area pain, does not radiate. Maintained on Tramadol 50 mg 2-4/day, he also continues with use of Meloxicam 15 mg daily and Tylenol PRN.  He reports that this regimen is helping his pain.  Taking a stool softener for constipation which is helping.       Has had multiple left knee injections and Synvisc injections which helped at first.  He needs a knee replacement but has been putting this off.  not interested in GNB as we have discussed before.      Discussed alcohol consumption.  Does admit to 1-2 drinks of beer/wine daily, but does not take while taking pain medication, states that he understands the dangers of this and does not do this.      Joint Pain   This is a chronic problem. The current episode started more than 1 year ago. The problem occurs daily. The problem has been waxing and waning. Associated symptoms include arthralgias and joint swelling (bilateral knees). Pertinent negatives include no abdominal pain, chest pain, chills, congestion, coughing, fatigue, fever, headaches, neck pain (Occ), numbness, vomiting or weakness. The symptoms are aggravated by walking and standing. He has tried NSAIDs for the symptoms. The treatment provided mild relief.   Back Pain   This is a chronic problem. The problem occurs daily. The problem has been waxing and waning since onset. The pain is present in the lumbar spine. The quality of the pain is described as aching. The pain is at a severity of 7/10. The symptoms are aggravated by standing (walking). Pertinent negatives include no abdominal pain, bladder incontinence,  "bowel incontinence, chest pain, fever, headaches, numbness or weakness. He has tried NSAIDs and analgesics for the symptoms. The treatment provided moderate relief.      PEG Assessment   What number best describes your pain on average in the past week?8  What number best describes how, during the past week, pain has interfered with your enjoyment of life?9  What number best describes how, during the past week, pain has interfered with your general activity?  9    The following portions of the patient's history were reviewed and updated as appropriate: allergies, current medications, past family history, past medical history, past social history, past surgical history and problem list.    Review of Systems   Constitutional: Negative for chills, fatigue and fever.   HENT: Negative for congestion.    Eyes: Negative for visual disturbance.   Respiratory: Negative for cough, shortness of breath and wheezing.    Cardiovascular: Negative.  Negative for chest pain.   Gastrointestinal: Positive for constipation. Negative for abdominal pain, bowel incontinence, diarrhea and vomiting.   Genitourinary: Positive for difficulty urinating. Negative for bladder incontinence.   Musculoskeletal: Positive for arthralgias, back pain and joint swelling (bilateral knees). Negative for neck pain (Occ).   Neurological: Negative for weakness, numbness and headaches.   Psychiatric/Behavioral: Negative for sleep disturbance and suicidal ideas. The patient is nervous/anxious.      Vitals:    08/22/19 1135   BP: 158/82   Pulse: 80   Resp: 18   Temp: 98.1 °F (36.7 °C)   SpO2: 93%   Weight: 89.5 kg (197 lb 6.4 oz)   Height: 175.3 cm (69.02\")   PainSc:   7   PainLoc: Knee     Objective   Physical Exam   Constitutional: He is oriented to person, place, and time. He appears well-developed and well-nourished. No distress.   HENT:   Head: Atraumatic.   Right Ear: External ear normal.   Eyes: Conjunctivae and EOM are normal.   Neck: Neck supple. "   Cardiovascular: Normal rate.   Pulmonary/Chest: Effort normal. No respiratory distress.   Musculoskeletal:        Right knee: Tenderness found.        Left knee: Tenderness found.        Lumbar back: He exhibits tenderness and bony tenderness.   Diffuse OA changes - no acute synovitis noted   +left SI joint tenderness   Neurological: He is alert and oriented to person, place, and time. He has normal strength. No sensory deficit. Gait (slowed, ambulates with cane) abnormal.   Skin: Skin is warm and dry. He is not diaphoretic.   Psychiatric: He has a normal mood and affect. His behavior is normal.   Nursing note and vitals reviewed.    Assessment/Plan   Omero was seen today for back pain and knee pain.    Diagnoses and all orders for this visit:    Other chronic pain    Other osteoarthritis involving multiple joints    Chronic left-sided low back pain without sciatica    Encounter for long-term current use of high risk medication      --- Declines interventional options at this time. Discussed Genicular Nerve Block and bilateral intra-articular hip injection.  Patient is focused on increasing the dose of his pain medication, I have discussed with him on many occasions including today that this is not an appropriate step when there are other options available to help manage his pain.    --- Refill Tramadol. Patient appears stable with current regimen. No adverse effects. Regarding continuation of opioids, there is no evidence of aberrant behavior or any red flags.  The patient continues with appropriate response to opioid therapy. ADL's remain intact by self.   --- The urine drug screen confirmation from 6/3/19 has been reviewed and the result is appropriate based on patient history and TUSHAR report  --- Follow-up 2 months        TUSHAR REPORT  As part of the patient's treatment plan, I am prescribing controlled substances. The patient has been made aware of appropriate use of such medications, including potential  risk of somnolence, limited ability to drive and/or work safely, and the potential for dependence or overdose. It has also bee made clear that these medications are for use by this patient only, without concomitant use of alcohol or other substances unless prescribed.     Patient has completed prescribing agreement detailing terms of continued prescribing of controlled substances, including monitoring TUSHAR reports, urine drug screening, and pill counts if necessary. The patient is aware that inappropriate use will results in cessation of prescribing such medications.    TUSHAR report has been reviewed and scanned into the patient's chart.    As the clinician, I personally reviewed the TUSHAR from 8/22/19 while the patient was in the office today.    History and physical exam exhibit continued safe and appropriate use of controlled substances.    EMR Dragon/Transcription disclaimer:   Much of this encounter note is an electronic transcription/translation of spoken language to printed text. The electronic translation of spoken language may permit erroneous, or at times, nonsensical words or phrases to be inadvertently transcribed; Although I have reviewed the note for such errors, some may still exist.

## 2019-12-30 RX ORDER — TRAMADOL HYDROCHLORIDE 50 MG/1
TABLET ORAL
Qty: 120 TABLET | Refills: 0 | OUTPATIENT
Start: 2019-12-30

## 2019-12-30 NOTE — TELEPHONE ENCOUNTER
Over 4 months since last ov with no schedule refills. Cancelled two appointments.  Needs f/u if this is to be continued.

## 2020-02-25 RX ORDER — TRAMADOL HYDROCHLORIDE 50 MG/1
TABLET ORAL
Qty: 120 TABLET | Refills: 0 | OUTPATIENT
Start: 2020-02-25

## 2020-03-03 ENCOUNTER — RESULTS ENCOUNTER (OUTPATIENT)
Dept: PAIN MEDICINE | Facility: CLINIC | Age: 75
End: 2020-03-03

## 2020-03-03 ENCOUNTER — OFFICE VISIT (OUTPATIENT)
Dept: PAIN MEDICINE | Facility: CLINIC | Age: 75
End: 2020-03-03

## 2020-03-03 VITALS
DIASTOLIC BLOOD PRESSURE: 94 MMHG | WEIGHT: 194.8 LBS | HEIGHT: 69 IN | TEMPERATURE: 98.2 F | RESPIRATION RATE: 18 BRPM | OXYGEN SATURATION: 93 % | BODY MASS INDEX: 28.85 KG/M2 | HEART RATE: 87 BPM | SYSTOLIC BLOOD PRESSURE: 168 MMHG

## 2020-03-03 DIAGNOSIS — G89.29 CHRONIC LEFT-SIDED LOW BACK PAIN WITHOUT SCIATICA: ICD-10-CM

## 2020-03-03 DIAGNOSIS — G89.29 OTHER CHRONIC PAIN: Primary | ICD-10-CM

## 2020-03-03 DIAGNOSIS — M15.8 OTHER OSTEOARTHRITIS INVOLVING MULTIPLE JOINTS: ICD-10-CM

## 2020-03-03 DIAGNOSIS — Z79.899 ENCOUNTER FOR LONG-TERM CURRENT USE OF HIGH RISK MEDICATION: ICD-10-CM

## 2020-03-03 DIAGNOSIS — M54.50 CHRONIC LEFT-SIDED LOW BACK PAIN WITHOUT SCIATICA: ICD-10-CM

## 2020-03-03 DIAGNOSIS — G89.29 OTHER CHRONIC PAIN: ICD-10-CM

## 2020-03-03 PROCEDURE — 99214 OFFICE O/P EST MOD 30 MIN: CPT | Performed by: NURSE PRACTITIONER

## 2020-03-03 RX ORDER — MELOXICAM 15 MG/1
15 TABLET ORAL DAILY
Qty: 90 TABLET | Refills: 1 | Status: SHIPPED | OUTPATIENT
Start: 2020-03-03

## 2020-03-03 RX ORDER — TRAMADOL HYDROCHLORIDE 50 MG/1
50 TABLET ORAL EVERY 6 HOURS PRN
Qty: 120 TABLET | Refills: 1 | Status: SHIPPED | OUTPATIENT
Start: 2020-03-03

## 2020-03-03 NOTE — PROGRESS NOTES
CHIEF COMPLAINT  Follow-up for back and knee pain. Pain has gotten worse since last appt.    Subjective   Omero García is a 74 y.o. male  who presents to the office for follow-up.He has a history of back pain, joint pain.    C/o back and joint pain (knee pain is most significant).  Left sided lumbosacral area pain, does not radiate. Maintained on Tramadol 50 mg which he takes very sparingly, he also continues with use of Meloxicam 15 mg daily and Tylenol PRN.  He reports that this regimen does help his pain.       Has had multiple left knee injections and Synvisc injections which helped at first.  He needs a knee replacement but has been putting this off.  Not interested in GNB as we have discussed before.       Discussed alcohol consumption.  Does admit to 1-2 drinks of beer/wine daily, but does not take while taking pain medication, states that he understands the dangers of this and does not do this.  Takes his pain medication VERY sparingly, #60 have lasted over 4 months.      Joint Pain   This is a chronic problem. The current episode started more than 1 year ago. The problem occurs daily. The problem has been waxing and waning. Associated symptoms include arthralgias, congestion, fatigue, joint swelling (left ankle) and weakness. Pertinent negatives include no abdominal pain, chest pain, chills, coughing, fever, headaches, neck pain (Occ), numbness or vomiting. The symptoms are aggravated by walking and standing. He has tried NSAIDs for the symptoms. The treatment provided mild relief.   Back Pain   This is a chronic problem. The problem occurs daily. The problem has been waxing and waning since onset. The pain is present in the lumbar spine. The quality of the pain is described as aching. The pain is at a severity of 7/10. The symptoms are aggravated by standing (walking). Associated symptoms include weakness. Pertinent negatives include no abdominal pain, bladder incontinence, bowel incontinence, chest  "pain, fever, headaches or numbness. He has tried NSAIDs and analgesics for the symptoms. The treatment provided moderate relief.      PEG Assessment   What number best describes your pain on average in the past week?8  What number best describes how, during the past week, pain has interfered with your enjoyment of life?9  What number best describes how, during the past week, pain has interfered with your general activity?  8    The following portions of the patient's history were reviewed and updated as appropriate: allergies, current medications, past family history, past medical history, past social history, past surgical history and problem list.    Review of Systems   Constitutional: Positive for fatigue. Negative for chills and fever.   HENT: Positive for congestion.    Eyes: Negative for visual disturbance.   Respiratory: Negative for cough, shortness of breath and wheezing.    Cardiovascular: Negative.  Negative for chest pain.   Gastrointestinal: Positive for constipation. Negative for abdominal pain, bowel incontinence, diarrhea and vomiting.   Genitourinary: Negative for bladder incontinence and difficulty urinating.   Musculoskeletal: Positive for arthralgias, back pain and joint swelling (left ankle). Negative for neck pain (Occ).   Neurological: Positive for weakness. Negative for numbness and headaches.   Psychiatric/Behavioral: Positive for sleep disturbance. Negative for suicidal ideas. The patient is not nervous/anxious.      Vitals:    03/03/20 1110   BP: 168/94   Pulse: 87   Resp: 18   Temp: 98.2 °F (36.8 °C)   SpO2: 93%   Weight: 88.4 kg (194 lb 12.8 oz)   Height: 175.3 cm (69.02\")   PainSc:   5   PainLoc: Back     Objective   Physical Exam   Constitutional: He is oriented to person, place, and time. He appears well-developed and well-nourished. No distress.   HENT:   Head: Atraumatic.   Right Ear: External ear normal.   Eyes: Conjunctivae and EOM are normal.   Neck: Neck supple. "   Cardiovascular: Normal rate.   Pulmonary/Chest: Effort normal. No respiratory distress.   Musculoskeletal:        Right knee: Tenderness found.        Left knee: Tenderness found.        Lumbar back: He exhibits tenderness and bony tenderness.   Diffuse OA changes - no acute synovitis noted   +left SI joint tenderness   Neurological: He is alert and oriented to person, place, and time. He has normal strength. No sensory deficit. Gait (slowed, ambulates with cane) abnormal.   Skin: Skin is warm and dry. He is not diaphoretic.   Psychiatric: He has a normal mood and affect. His behavior is normal.   Nursing note and vitals reviewed.    Assessment/Plan   Omero was seen today for back pain and knee pain.    Diagnoses and all orders for this visit:    Other chronic pain  -     Oral Fluid Drug Screen - Saliva, Oral Cavity; Future    Chronic left-sided low back pain without sciatica  -     Oral Fluid Drug Screen - Saliva, Oral Cavity; Future    Other osteoarthritis involving multiple joints  -     Oral Fluid Drug Screen - Saliva, Oral Cavity; Future    Encounter for long-term current use of high risk medication  -     Oral Fluid Drug Screen - Saliva, Oral Cavity; Future    Other orders  -     traMADol (ULTRAM) 50 MG tablet; Take 1 tablet by mouth Every 6 (Six) Hours As Needed for Severe Pain .  -     meloxicam (MOBIC) 15 MG tablet; Take 1 tablet by mouth Daily.      --- Declines interventional options at this time. Discussed Genicular Nerve Block and bilateral intra-articular hip injection.  Patient is focused on increasing the dose of his pain medication, I have discussed with him on many occasions including today that this is not an appropriate step when there are other options available to help manage his pain.  NO DOSE ESCALATIONS!!  --- The urine drug screen confirmation from 6/3/2019 has been reviewed and the result is appropriate based on patient history and TUSHAR report  --- Routine ODT in office today as part  of monitoring requirements for controlled substances.  This specimen will be sent to [facility] laboratory for confirmation.     --- Refill Tramadol. Patient appears stable with current regimen. No adverse effects. Regarding continuation of opioids, there is no evidence of aberrant behavior or any red flags.  The patient continues with appropriate response to opioid therapy. ADL's remain intact by self.   --- Follow-up 2 months      TUSHAR REPORT  As part of the patient's treatment plan, I am prescribing controlled substances. The patient has been made aware of appropriate use of such medications, including potential risk of somnolence, limited ability to drive and/or work safely, and the potential for dependence or overdose. It has also bee made clear that these medications are for use by this patient only, without concomitant use of alcohol or other substances unless prescribed.     Patient has completed prescribing agreement detailing terms of continued prescribing of controlled substances, including monitoring TUSHAR reports, urine drug screening, and pill counts if necessary. The patient is aware that inappropriate use will results in cessation of prescribing such medications.    TUSHAR report has been reviewed and scanned into the patient's chart.    As the clinician, I personally reviewed the TUSHAR from 3/3/2020 while the patient was in the office today.    History and physical exam exhibit continued safe and appropriate use of controlled substances.    EMR Dragon/Transcription disclaimer:   Much of this encounter note is an electronic transcription/translation of spoken language to printed text. The electronic translation of spoken language may permit erroneous, or at times, nonsensical words or phrases to be inadvertently transcribed; Although I have reviewed the note for such errors, some may still exist.

## 2020-03-03 NOTE — PATIENT INSTRUCTIONS
-----  Education about Genicular Nerve Blocks & RF at the Knee:    We engaged in an education session about the theory of genicular nerve blockade at the knee and also how radiofrequency therapy may help to provide sustained relief from chronic knee pain.      In patients with long term knee pain and/or chronic degenerative joint disease, who either fail or are not good candidates for long term medication management of knee pain, and who may have failed with intraarticular corticosteroid and/or hyaluronic acid injections in the knee, and who either may not be good orthopedic surgical candidates or may be wanting to avoid knee replacement, or have had knee replacements but continue to have severe pain, the consideration for Genicular nerve RF therapy may be a reasonable option to try to provide longer term pain relief.       The potential efficacy of the radiofrequency therapy is considered with genicular nerve blockades as a diagnostic therapy.  There is prognostic indication that patients who have diagnostic positive blockades of the nerves may respond well to radiofrequency therapy of the same nerves.      The genicular nerves are sensory nerve branches of major nerves to the lower extremity, especially the tibial and the common peroneal nerves.  The medial branches are primarily supplied by the tibial nerve, and the lateral branches primarily by the common peroneal nerve.  These four genicular nerve branches in essence form a square of innervation around the knee.  The inferolateral branch is not easily accessible because of the fibula.  Therefore, the three branches targeted are the superolateral, superomedial, and inferomedial.      Under Xray guidance, needles are placed to these three branches' approximate locations and the branches are numbed with local anesthetic.  The question is whether blocking these branches is able to block the patient's perception of pain to the area, thus making the procedure a  diagnostic blockade.   Usually the blockade is repeated to confirm reproducible diagnostic results.  Most often the blockades are performed under monitored anesthesia care, to aid in the patient being able awaken quickly from sedation and be able to engage in activities, therefore making this process an active diagnostic procedure.      If diagnostically positive (oftentimes x2), then Radiofrequency lesioning of the branches may be considered.  One or two needles are placed to the location of each nerve branch, and the needles are heated to thermally treat the nerve branches by injuring the branches, thereby inhibiting their ability to transmit painful sensory signals.  This therapy is usually performed under monitored anesthesia care as well.  The procedure could be repeated every 6-12 months as needed.  Risks of the procedure include but are not limited to bleeding, infection, injury, worsening of clinical picture, and nerve injury.    ------

## 2020-12-10 RX ORDER — TRAMADOL HYDROCHLORIDE 50 MG/1
TABLET ORAL
Qty: 120 TABLET | Refills: 0 | OUTPATIENT
Start: 2020-12-10

## 2020-12-10 NOTE — TELEPHONE ENCOUNTER
"Called and spoke with pt told him it has been 9 months since last seen in office, and to schedule ov for refill on tramadol. He told me \"listen let me talk so you can save your breath, I'm wheelchair bound and cannot leave my house to come to your office, I can do a telehealth visit. I educated pt about telehealth visit then pt sts he cannot do telehealth. I told pt you requested telehealth, I can ask provider if she would allow virtual visit, he then said \"forget it then.\" call disconnected. Please advise. Thank you. "

## 2020-12-10 NOTE — TELEPHONE ENCOUNTER
MUST have a visit. I would be open to [employer] X 1 refill and the next physically in office, but he must have a check in.  If he does not feel that he is able to comply with this he may need someone else to prescribe his medication.